# Patient Record
Sex: FEMALE | Race: WHITE | NOT HISPANIC OR LATINO | Employment: OTHER | URBAN - METROPOLITAN AREA
[De-identification: names, ages, dates, MRNs, and addresses within clinical notes are randomized per-mention and may not be internally consistent; named-entity substitution may affect disease eponyms.]

---

## 2023-04-14 LAB
ALANINE AMINOTRANSFERASE (SGPT) (U/L) IN SER/PLAS: 8 U/L (ref 7–45)
ALBUMIN (G/DL) IN SER/PLAS: 4.1 G/DL (ref 3.4–5)
ALKALINE PHOSPHATASE (U/L) IN SER/PLAS: 82 U/L (ref 33–136)
ANION GAP IN SER/PLAS: 13 MMOL/L (ref 10–20)
ASPARTATE AMINOTRANSFERASE (SGOT) (U/L) IN SER/PLAS: 12 U/L (ref 9–39)
BASOPHILS (10*3/UL) IN BLOOD BY AUTOMATED COUNT: 0.07 X10E9/L (ref 0–0.1)
BASOPHILS/100 LEUKOCYTES IN BLOOD BY AUTOMATED COUNT: 1.1 % (ref 0–2)
BILIRUBIN TOTAL (MG/DL) IN SER/PLAS: 0.3 MG/DL (ref 0–1.2)
CALCIDIOL (25 OH VITAMIN D3) (NG/ML) IN SER/PLAS: 33 NG/ML
CALCIUM (MG/DL) IN SER/PLAS: 9.3 MG/DL (ref 8.6–10.6)
CARBON DIOXIDE, TOTAL (MMOL/L) IN SER/PLAS: 27 MMOL/L (ref 21–32)
CHLORIDE (MMOL/L) IN SER/PLAS: 107 MMOL/L (ref 98–107)
CHOLESTEROL (MG/DL) IN SER/PLAS: 180 MG/DL (ref 0–199)
CHOLESTEROL IN HDL (MG/DL) IN SER/PLAS: 61.3 MG/DL
CHOLESTEROL/HDL RATIO: 2.9
COBALAMIN (VITAMIN B12) (PG/ML) IN SER/PLAS: 246 PG/ML (ref 211–911)
CREATININE (MG/DL) IN SER/PLAS: 0.94 MG/DL (ref 0.5–1.05)
EOSINOPHILS (10*3/UL) IN BLOOD BY AUTOMATED COUNT: 0.21 X10E9/L (ref 0–0.4)
EOSINOPHILS/100 LEUKOCYTES IN BLOOD BY AUTOMATED COUNT: 3.3 % (ref 0–6)
ERYTHROCYTE DISTRIBUTION WIDTH (RATIO) BY AUTOMATED COUNT: 13.9 % (ref 11.5–14.5)
ERYTHROCYTE MEAN CORPUSCULAR HEMOGLOBIN CONCENTRATION (G/DL) BY AUTOMATED: 31.9 G/DL (ref 32–36)
ERYTHROCYTE MEAN CORPUSCULAR VOLUME (FL) BY AUTOMATED COUNT: 91 FL (ref 80–100)
ERYTHROCYTES (10*6/UL) IN BLOOD BY AUTOMATED COUNT: 4.69 X10E12/L (ref 4–5.2)
GFR FEMALE: 62 ML/MIN/1.73M2
GLUCOSE (MG/DL) IN SER/PLAS: 112 MG/DL (ref 74–99)
HEMATOCRIT (%) IN BLOOD BY AUTOMATED COUNT: 42.6 % (ref 36–46)
HEMOGLOBIN (G/DL) IN BLOOD: 13.6 G/DL (ref 12–16)
IMMATURE GRANULOCYTES/100 LEUKOCYTES IN BLOOD BY AUTOMATED COUNT: 0.2 % (ref 0–0.9)
LDL: 101 MG/DL (ref 0–99)
LEUKOCYTES (10*3/UL) IN BLOOD BY AUTOMATED COUNT: 6.3 X10E9/L (ref 4.4–11.3)
LYMPHOCYTES (10*3/UL) IN BLOOD BY AUTOMATED COUNT: 1.38 X10E9/L (ref 0.8–3)
LYMPHOCYTES/100 LEUKOCYTES IN BLOOD BY AUTOMATED COUNT: 21.9 % (ref 13–44)
MONOCYTES (10*3/UL) IN BLOOD BY AUTOMATED COUNT: 0.61 X10E9/L (ref 0.05–0.8)
MONOCYTES/100 LEUKOCYTES IN BLOOD BY AUTOMATED COUNT: 9.7 % (ref 2–10)
NEUTROPHILS (10*3/UL) IN BLOOD BY AUTOMATED COUNT: 4.01 X10E9/L (ref 1.6–5.5)
NEUTROPHILS/100 LEUKOCYTES IN BLOOD BY AUTOMATED COUNT: 63.8 % (ref 40–80)
NRBC (PER 100 WBCS) BY AUTOMATED COUNT: 0 /100 WBC (ref 0–0)
PLATELETS (10*3/UL) IN BLOOD AUTOMATED COUNT: 181 X10E9/L (ref 150–450)
POTASSIUM (MMOL/L) IN SER/PLAS: 4.3 MMOL/L (ref 3.5–5.3)
PROTEIN TOTAL: 6.5 G/DL (ref 6.4–8.2)
SODIUM (MMOL/L) IN SER/PLAS: 143 MMOL/L (ref 136–145)
THYROTROPIN (MIU/L) IN SER/PLAS BY DETECTION LIMIT <= 0.05 MIU/L: 0.59 MIU/L (ref 0.44–3.98)
TRIGLYCERIDE (MG/DL) IN SER/PLAS: 87 MG/DL (ref 0–149)
UREA NITROGEN (MG/DL) IN SER/PLAS: 25 MG/DL (ref 6–23)
VLDL: 17 MG/DL (ref 0–40)

## 2023-10-24 ENCOUNTER — APPOINTMENT (OUTPATIENT)
Dept: PRIMARY CARE | Facility: CLINIC | Age: 78
End: 2023-10-24
Payer: MEDICARE

## 2023-11-21 ENCOUNTER — OFFICE VISIT (OUTPATIENT)
Dept: PRIMARY CARE | Facility: CLINIC | Age: 78
End: 2023-11-21
Payer: MEDICARE

## 2023-11-21 VITALS
WEIGHT: 134.26 LBS | DIASTOLIC BLOOD PRESSURE: 60 MMHG | RESPIRATION RATE: 16 BRPM | BODY MASS INDEX: 22.92 KG/M2 | SYSTOLIC BLOOD PRESSURE: 118 MMHG | HEART RATE: 90 BPM | OXYGEN SATURATION: 98 % | HEIGHT: 64 IN

## 2023-11-21 DIAGNOSIS — E78.5 DYSLIPIDEMIA: ICD-10-CM

## 2023-11-21 DIAGNOSIS — K21.00 GASTROESOPHAGEAL REFLUX DISEASE WITH ESOPHAGITIS WITHOUT HEMORRHAGE: ICD-10-CM

## 2023-11-21 DIAGNOSIS — F41.8 DEPRESSION WITH ANXIETY: Primary | ICD-10-CM

## 2023-11-21 DIAGNOSIS — J43.8 OTHER EMPHYSEMA (MULTI): ICD-10-CM

## 2023-11-21 DIAGNOSIS — I10 PRIMARY HYPERTENSION: ICD-10-CM

## 2023-11-21 PROCEDURE — 3078F DIAST BP <80 MM HG: CPT | Performed by: INTERNAL MEDICINE

## 2023-11-21 PROCEDURE — 90662 IIV NO PRSV INCREASED AG IM: CPT | Performed by: INTERNAL MEDICINE

## 2023-11-21 PROCEDURE — 3074F SYST BP LT 130 MM HG: CPT | Performed by: INTERNAL MEDICINE

## 2023-11-21 PROCEDURE — G0008 ADMIN INFLUENZA VIRUS VAC: HCPCS | Performed by: INTERNAL MEDICINE

## 2023-11-21 PROCEDURE — 99214 OFFICE O/P EST MOD 30 MIN: CPT | Performed by: INTERNAL MEDICINE

## 2023-11-21 RX ORDER — LEVOTHYROXINE SODIUM 75 UG/1
TABLET ORAL
COMMUNITY
End: 2023-12-14

## 2023-11-21 RX ORDER — BUPROPION HYDROCHLORIDE 100 MG/1
TABLET, EXTENDED RELEASE ORAL
COMMUNITY
End: 2023-11-21 | Stop reason: SDUPTHER

## 2023-11-21 RX ORDER — BUDESONIDE AND FORMOTEROL FUMARATE DIHYDRATE 80; 4.5 UG/1; UG/1
AEROSOL RESPIRATORY (INHALATION)
Qty: 1 EACH | Refills: 5 | Status: SHIPPED | OUTPATIENT
Start: 2023-11-21 | End: 2024-02-06 | Stop reason: SDUPTHER

## 2023-11-21 RX ORDER — BUDESONIDE AND FORMOTEROL FUMARATE DIHYDRATE 80; 4.5 UG/1; UG/1
2 AEROSOL RESPIRATORY (INHALATION) 2 TIMES DAILY
COMMUNITY
End: 2023-11-21 | Stop reason: SDUPTHER

## 2023-11-21 RX ORDER — ROSUVASTATIN CALCIUM 10 MG/1
TABLET, COATED ORAL
COMMUNITY
End: 2024-05-31 | Stop reason: SDUPTHER

## 2023-11-21 RX ORDER — EPINEPHRINE 0.3 MG/.3ML
0.3 INJECTION SUBCUTANEOUS
COMMUNITY

## 2023-11-21 RX ORDER — OMEPRAZOLE 20 MG/1
CAPSULE, DELAYED RELEASE ORAL
COMMUNITY
End: 2023-12-14

## 2023-11-21 RX ORDER — UBIDECARENONE 75 MG
1 CAPSULE ORAL
COMMUNITY

## 2023-11-21 RX ORDER — BUPROPION HYDROCHLORIDE 200 MG/1
TABLET, EXTENDED RELEASE ORAL
Qty: 30 TABLET | Refills: 1 | Status: SHIPPED | OUTPATIENT
Start: 2023-11-21 | End: 2024-01-08 | Stop reason: SDUPTHER

## 2023-11-21 RX ORDER — FAMOTIDINE 20 MG/1
TABLET, FILM COATED ORAL
Qty: 90 TABLET | Refills: 1 | Status: SHIPPED | OUTPATIENT
Start: 2023-11-21 | End: 2024-01-05 | Stop reason: ALTCHOICE

## 2023-11-21 RX ORDER — ERGOCALCIFEROL (VITAMIN D2) 200 MCG/ML
DROPS ORAL
COMMUNITY
End: 2024-03-05

## 2023-11-21 RX ORDER — NAPROXEN SODIUM 220 MG
TABLET ORAL
COMMUNITY
Start: 2015-04-09

## 2023-11-21 RX ORDER — METOPROLOL SUCCINATE 50 MG/1
50 TABLET, EXTENDED RELEASE ORAL DAILY
COMMUNITY
End: 2024-01-22

## 2023-11-21 ASSESSMENT — COLUMBIA-SUICIDE SEVERITY RATING SCALE - C-SSRS
1. IN THE PAST MONTH, HAVE YOU WISHED YOU WERE DEAD OR WISHED YOU COULD GO TO SLEEP AND NOT WAKE UP?: NO
6. HAVE YOU EVER DONE ANYTHING, STARTED TO DO ANYTHING, OR PREPARED TO DO ANYTHING TO END YOUR LIFE?: NO
2. HAVE YOU ACTUALLY HAD ANY THOUGHTS OF KILLING YOURSELF?: NO

## 2023-11-21 ASSESSMENT — PATIENT HEALTH QUESTIONNAIRE - PHQ9
2. FEELING DOWN, DEPRESSED OR HOPELESS: NOT AT ALL
1. LITTLE INTEREST OR PLEASURE IN DOING THINGS: NOT AT ALL
SUM OF ALL RESPONSES TO PHQ9 QUESTIONS 1 AND 2: 0

## 2023-11-21 ASSESSMENT — ENCOUNTER SYMPTOMS
LOSS OF SENSATION IN FEET: 0
DEPRESSION: 0
OCCASIONAL FEELINGS OF UNSTEADINESS: 0
CONSTIPATION: 0

## 2023-11-21 NOTE — PATIENT INSTRUCTIONS
Restart watching your diet, continue reducting your smoke frequency, today you received flu vaccine, in 2 weeks complete covid booster, Increase your bupropion to  200, add your famotdine at bed time, return here in 3months

## 2023-11-21 NOTE — PROGRESS NOTES
"Subjective   Patient ID: Caty Marquez is a 77 y.o. female who presents for Hypertension and Arthritis.    HPI   Her   had osteomyelitis, 6 weeks ago, still on rehab, her diet has not been heatlhy.  She has been taking  100 mg bupropion 12 h release since March, she has been taking in am, she has not noticed cleaning improvement  She  is smoking less  , related to stress, but minimal amount   Heartburn still is present on omeprazole in am.   She had bilateral carata surgery in spring.  She received bilateral steroid injection on April with great response,but no longer effective  Review of Systems   Cardiovascular:  Negative for chest pain.        Palpitations when lies down for years on/off, improved,   Gastrointestinal:  Negative for constipation.   All other systems reviewed and are negative.      Objective   /60 (BP Location: Right arm, Patient Position: Sitting, BP Cuff Size: Adult)   Pulse 90   Resp 16   Ht 1.626 m (5' 4\")   Wt 60.9 kg (134 lb 4.2 oz)   SpO2 98%   BMI 23.05 kg/m²     Physical Exam  Eyes - conjunctivae clear, PERRLA  HEENT - no impacted wax  Neck - no cervical lymphadenopathy, no thyromegaly  Axilla - no palpable lymphadenopathy  Cardiac- regular rate and rhythm, no murmurs, no carotid bruit, no JVP  Lung - clear to auscultation, no rales, no rhonchi, no wheezing  GI - normally active bowel sounds, non tender, non distended, no hepatosplenomegaly, no rebound  MSK - non deformities  Extremities - no edema, good distal pulses  Neuro - non focal, oriented x 3  Skin - no bruises, no rashes  Psychiatric - pleasant, well groom, no hallucinations    Assessment/Plan   Problem List Items Addressed This Visit             ICD-10-CM       Cardiac and Vasculature    Primary hypertension I10    Dyslipidemia E78.5       Gastrointestinal and Abdominal    Gastroesophageal reflux disease with esophagitis without hemorrhage K21.00    Relevant Medications    famotidine (Pepcid) 20 mg tablet    "    Mental Health    Depression with anxiety - Primary F41.8     Increase bupropion to 200 SR today         Relevant Medications    buPROPion SR (Wellbutrin SR) 200 mg 12 hr tablet       Pulmonary and Pneumonias    Other emphysema (CMS/HCC) J43.8    Relevant Medications    Symbicort 80-4.5 mcg/actuation inhaler

## 2023-12-11 ENCOUNTER — TELEPHONE (OUTPATIENT)
Dept: PRIMARY CARE | Facility: CLINIC | Age: 78
End: 2023-12-11

## 2023-12-12 ENCOUNTER — APPOINTMENT (OUTPATIENT)
Dept: PRIMARY CARE | Facility: CLINIC | Age: 78
End: 2023-12-12
Payer: MEDICARE

## 2023-12-12 ENCOUNTER — OFFICE VISIT (OUTPATIENT)
Dept: PRIMARY CARE | Facility: CLINIC | Age: 78
End: 2023-12-12
Payer: MEDICARE

## 2023-12-12 VITALS
RESPIRATION RATE: 16 BRPM | OXYGEN SATURATION: 95 % | SYSTOLIC BLOOD PRESSURE: 90 MMHG | DIASTOLIC BLOOD PRESSURE: 58 MMHG | BODY MASS INDEX: 22.81 KG/M2 | WEIGHT: 133.6 LBS | HEIGHT: 64 IN | HEART RATE: 78 BPM

## 2023-12-12 DIAGNOSIS — M47.22 CERVICAL RADICULOPATHY DUE TO DEGENERATIVE JOINT DISEASE OF SPINE: ICD-10-CM

## 2023-12-12 DIAGNOSIS — I10 PRIMARY HYPERTENSION: ICD-10-CM

## 2023-12-12 DIAGNOSIS — J20.8 ACUTE BRONCHITIS DUE TO OTHER SPECIFIED ORGANISMS: Primary | ICD-10-CM

## 2023-12-12 PROCEDURE — 99214 OFFICE O/P EST MOD 30 MIN: CPT | Performed by: INTERNAL MEDICINE

## 2023-12-12 PROCEDURE — 3074F SYST BP LT 130 MM HG: CPT | Performed by: INTERNAL MEDICINE

## 2023-12-12 PROCEDURE — 3078F DIAST BP <80 MM HG: CPT | Performed by: INTERNAL MEDICINE

## 2023-12-12 RX ORDER — AZITHROMYCIN 250 MG/1
TABLET, FILM COATED ORAL
Qty: 6 TABLET | Refills: 0 | Status: SHIPPED | OUTPATIENT
Start: 2023-12-12 | End: 2023-12-17

## 2023-12-12 RX ORDER — METHYLPREDNISOLONE 4 MG/1
TABLET ORAL
Qty: 21 TABLET | Refills: 0 | Status: SHIPPED | OUTPATIENT
Start: 2023-12-12 | End: 2024-01-05 | Stop reason: ALTCHOICE

## 2023-12-12 ASSESSMENT — PATIENT HEALTH QUESTIONNAIRE - PHQ9
SUM OF ALL RESPONSES TO PHQ9 QUESTIONS 1 AND 2: 0
2. FEELING DOWN, DEPRESSED OR HOPELESS: NOT AT ALL
1. LITTLE INTEREST OR PLEASURE IN DOING THINGS: NOT AT ALL

## 2023-12-12 ASSESSMENT — ENCOUNTER SYMPTOMS
DEPRESSION: 0
OCCASIONAL FEELINGS OF UNSTEADINESS: 0
LOSS OF SENSATION IN FEET: 0
NAUSEA: 0
WHEEZING: 0
SORE THROAT: 0
DIARRHEA: 0
SINUS PRESSURE: 0

## 2023-12-12 NOTE — PATIENT INSTRUCTIONS
Please re start  using albuterol rescue 2 puffs every 8 to 12 hours, take tessalon pearsl and prednisone /antibiotic

## 2023-12-12 NOTE — PROGRESS NOTES
"Subjective   Patient ID: Caty Marquez is a 77 y.o. female who presents for Cough, chest congestion, Headache, Generalized Body Aches, and Fatigue.    HPI 5 days started with body aches, rhinorrea, next day productive yellow cough, intermitent headache, chills, warm, intermitent earache, exertinal wheezing,   OTC acetaminophen  Since she got sick she increased her symbicort  Sick contacts unknown  Also 5 days ago after lifiting heavy started having intense neck pain , worse at rotation , radiated to scalp, feels left arm/ hand slight weak , no paresthesia or numbness. She recalls similar episode several years ago. No trauma.     Review of Systems   HENT:  Negative for postnasal drip, sinus pressure and sore throat.    Respiratory:  Negative for wheezing.    Gastrointestinal:  Negative for diarrhea and nausea.   All other systems reviewed and are negative.      Objective   BP 90/58 (BP Location: Right arm, Patient Position: Sitting, BP Cuff Size: Adult)   Pulse 78   Resp 16   Ht 1.626 m (5' 4\")   Wt 60.6 kg (133 lb 9.6 oz)   SpO2 95%   BMI 22.93 kg/m²     Physical Exam  General- non toxic, no respiratory distress  Eyes- Conjunctiva clear, PERRLA  Ears- TMs clear, no erythema, no fluid, TMs not retracted or bulging  Nose- external normal, turbinates free of swelling, erythema, and drainage, no sinus tenderness  Throat- mucus membranes moist, no erythema, no sinus tract drainage, no exudates or lesions  Neck- nontender, no enlarged cervical lymphadenopathy  Cardiac- regular rate and rhythm, no murmurs, no gallop or rubs  Lung- clear to auscultation, no rales, no rhonchi, no wheezing  GI- , nontender,no guarding  Extremities- no edema  Msk no tender spine , ROM very limited ,more looking to left side  Neuro- 4/5 strengh on left arm, oriented x 3  Psychiatric- no hallucinations    Assessment/Plan   Problem List Items Addressed This Visit    None  Visit Diagnoses         Codes    Acute bronchitis due to other " specified organisms    -  Primary J20.8    Relevant Medications    azithromycin (Zithromax) 250 mg tablet    Cervical radiculopathy due to degenerative joint disease of spine     M47.22    Relevant Medications    methylPREDNISolone (Medrol Dospak) 4 mg tablets

## 2023-12-13 DIAGNOSIS — E03.9 HYPOTHYROIDISM, UNSPECIFIED: ICD-10-CM

## 2023-12-13 DIAGNOSIS — K21.9 GASTRO-ESOPHAGEAL REFLUX DISEASE WITHOUT ESOPHAGITIS: ICD-10-CM

## 2023-12-14 RX ORDER — OMEPRAZOLE 20 MG/1
CAPSULE, DELAYED RELEASE ORAL
Qty: 180 CAPSULE | Refills: 1 | Status: SHIPPED | OUTPATIENT
Start: 2023-12-14 | End: 2024-03-25

## 2023-12-14 RX ORDER — LEVOTHYROXINE SODIUM 75 UG/1
TABLET ORAL
Qty: 90 TABLET | Refills: 1 | Status: SHIPPED | OUTPATIENT
Start: 2023-12-14 | End: 2024-03-25

## 2023-12-22 ENCOUNTER — APPOINTMENT (OUTPATIENT)
Dept: VASCULAR SURGERY | Facility: HOSPITAL | Age: 78
End: 2023-12-22
Payer: MEDICARE

## 2023-12-27 ENCOUNTER — APPOINTMENT (OUTPATIENT)
Dept: VASCULAR SURGERY | Facility: HOSPITAL | Age: 78
End: 2023-12-27
Payer: MEDICARE

## 2024-01-05 ENCOUNTER — HOSPITAL ENCOUNTER (OUTPATIENT)
Dept: VASCULAR MEDICINE | Facility: HOSPITAL | Age: 79
Discharge: HOME | End: 2024-01-05
Payer: MEDICARE

## 2024-01-05 ENCOUNTER — OFFICE VISIT (OUTPATIENT)
Dept: VASCULAR SURGERY | Facility: HOSPITAL | Age: 79
End: 2024-01-05
Payer: MEDICARE

## 2024-01-05 VITALS
DIASTOLIC BLOOD PRESSURE: 67 MMHG | SYSTOLIC BLOOD PRESSURE: 147 MMHG | OXYGEN SATURATION: 99 % | HEIGHT: 64 IN | HEART RATE: 88 BPM | BODY MASS INDEX: 22.53 KG/M2 | WEIGHT: 132 LBS

## 2024-01-05 DIAGNOSIS — I73.9 PERIPHERAL VASCULAR DISEASE, UNSPECIFIED (CMS-HCC): ICD-10-CM

## 2024-01-05 DIAGNOSIS — I73.9 PAD (PERIPHERAL ARTERY DISEASE) (CMS-HCC): Primary | ICD-10-CM

## 2024-01-05 DIAGNOSIS — I71.40 ABDOMINAL AORTIC ANEURYSM (AAA) WITHOUT RUPTURE, UNSPECIFIED PART (CMS-HCC): ICD-10-CM

## 2024-01-05 DIAGNOSIS — I10 ESSENTIAL (PRIMARY) HYPERTENSION: ICD-10-CM

## 2024-01-05 DIAGNOSIS — I82.622 ACUTE EMBOLISM AND THROMBOSIS OF DEEP VEINS OF LEFT UPPER EXTREMITY (MULTI): ICD-10-CM

## 2024-01-05 DIAGNOSIS — I71.43 INFRARENAL ABDOMINAL AORTIC ANEURYSM (AAA) WITHOUT RUPTURE (CMS-HCC): ICD-10-CM

## 2024-01-05 DIAGNOSIS — I71.40 ABDOMINAL AORTIC ANEURYSM, WITHOUT RUPTURE, UNSPECIFIED (CMS-HCC): ICD-10-CM

## 2024-01-05 LAB
BODY SURFACE AREA: 1.64 M2
BODY SURFACE AREA: 1.64 M2

## 2024-01-05 PROCEDURE — 93922 UPR/L XTREMITY ART 2 LEVELS: CPT

## 2024-01-05 PROCEDURE — 99213 OFFICE O/P EST LOW 20 MIN: CPT | Performed by: NURSE PRACTITIONER

## 2024-01-05 PROCEDURE — 4004F PT TOBACCO SCREEN RCVD TLK: CPT | Performed by: NURSE PRACTITIONER

## 2024-01-05 PROCEDURE — 3078F DIAST BP <80 MM HG: CPT | Performed by: NURSE PRACTITIONER

## 2024-01-05 PROCEDURE — 93978 VASCULAR STUDY: CPT | Performed by: INTERNAL MEDICINE

## 2024-01-05 PROCEDURE — 3077F SYST BP >= 140 MM HG: CPT | Performed by: NURSE PRACTITIONER

## 2024-01-05 PROCEDURE — 93978 VASCULAR STUDY: CPT

## 2024-01-05 PROCEDURE — 93922 UPR/L XTREMITY ART 2 LEVELS: CPT | Performed by: INTERNAL MEDICINE

## 2024-01-05 PROCEDURE — 99406 BEHAV CHNG SMOKING 3-10 MIN: CPT | Performed by: NURSE PRACTITIONER

## 2024-01-05 ASSESSMENT — ENCOUNTER SYMPTOMS
OCCASIONAL FEELINGS OF UNSTEADINESS: 0
LOSS OF SENSATION IN FEET: 0
DEPRESSION: 0

## 2024-01-08 DIAGNOSIS — F41.8 DEPRESSION WITH ANXIETY: ICD-10-CM

## 2024-01-08 RX ORDER — BUPROPION HYDROCHLORIDE 200 MG/1
TABLET, EXTENDED RELEASE ORAL
Qty: 30 TABLET | Refills: 1 | Status: SHIPPED | OUTPATIENT
Start: 2024-01-08 | End: 2024-02-06 | Stop reason: SDUPTHER

## 2024-01-19 DIAGNOSIS — I10 ESSENTIAL (PRIMARY) HYPERTENSION: ICD-10-CM

## 2024-01-19 NOTE — PROGRESS NOTES
Vascular Surgery Clinic Note    CC: FUV PAD and AAA    History Of Present Illness:   Caty Marquez is a 78 y.o. female here for routine follow-up.  She has known peripheral artery disease and is currently engaging in a walking program.  She is able to walk 1/2 mile distance.  This is not lifestyle limiting for her.  She denies nocturnal rest pain or tissue loss.  DAVID today is 0.72 on the right and 0.71 on the left.    She still works at [employer] and her [employer].    She denies amaurosis fugax or TIA symptoms.    She has a known small abdominal aortic aneurysm that currently measures 3.2 cm and is unchanged from prior imaging.  She continues to smoke 1 cigarette a day.     Medical History:  Patient Active Problem List   Diagnosis    Depression with anxiety    Other emphysema (CMS/HCC)    Primary hypertension    Dyslipidemia    Gastroesophageal reflux disease with esophagitis without hemorrhage    Acute bronchitis due to other specified organisms    Cervical radiculopathy due to degenerative joint disease of spine        SH:    Social Determinants of Health     Tobacco Use: High Risk (1/5/2024)    Patient History     Smoking Tobacco Use: Some Days     Smokeless Tobacco Use: Never     Passive Exposure: Not on file   Alcohol Use: Not on file   Financial Resource Strain: Not on file   Food Insecurity: Not on file   Transportation Needs: Not on file   Physical Activity: Not on file   Stress: Not on file   Social Connections: Not on file   Intimate Partner Violence: Not on file   Depression: Not at risk (12/12/2023)    PHQ-2     PHQ-2 Score: 0   Housing Stability: Not on file   Utilities: Not on file   Digital Equity: Not on file        FH:  No family history on file.     Allergies:   Allergies   Allergen Reactions    Bee Sting Kit Anaphylaxis    Quinolones Anaphylaxis       ROS:  All systems were reviewed and noted to be negative, other than described above.     Objective:  Last Recorded Vitals  Blood pressure  "147/67, pulse 88, height 1.626 m (5' 4\"), weight 59.9 kg (132 lb), SpO2 99 %.    Meds:   Current Outpatient Medications   Medication Instructions    albuterol 90 mcg/actuation aerosol powdr breath activated inhaler inhalation, Every 4 hours PRN    buPROPion SR (Wellbutrin SR) 200 mg 12 hr tablet Do not crush, chew, or split.    cyanocobalamin (Vitamin B-12) 500 mcg tablet 1 tablet, oral, Daily RT    EPINEPHrine (EPIPEN) 0.3 mg, intramuscular, As Directed    ergocalciferol (Vitamin D-2) 200 mcg/mL (8,000 unit/mL) drops oral    metoprolol succinate XL (TOPROL-XL) 50 mg, oral, Daily    naproxen sodium (Aleve) 220 mg tablet oral    omeprazole (PriLOSEC) 20 mg DR capsule TAKE 1 CAPSULE DAILY EVERY MORNING 30 minutes BEFORE BREAKFAST. AND AT BED TIME    rosuvastatin (Crestor) 10 mg tablet TAKE 1 TABLET BY MOUTH, 4 X WEEK,    Symbicort 80-4.5 mcg/actuation inhaler Use 2 puffs once a day, Rinse mouth after use.    Synthroid 75 mcg tablet TAKE 1 TABLET BY MOUTH 30 min prior to breakast       Exam:  Constitutional: Well appearing, NAD   PSYCH: Appropriate mood and affect  Eyes: Sclera clear   Neck: Supple  CV: No tachycardia   RESP: Unlabored breathing   GI: Soft, nontender, non-distended.  SKIN: No lesions  NEURO: No focal deficits noted  EXTREMITIES: Warm & well perfused. No leg edema.     Imaging Reviewed:  DAVID without exercise 01/05/2024    [place] [address]  Tel [phone] and Fax [phone]      Vascular Lab Report  [facility] US ANKLE BRACHIAL INDEX (DAVID) WITHOUT EXERCISE      Patient Name:      MACKENZIE Bruce Physician:  [id number] Madeline Javier MD  Study Date:        1/5/2024            Ordering Physician: [id number] DAO GAITAN  MRN/PID:           [id number]            Technologist:       Petra Clement RVSARAN  Accession#:        [id number]        Technologist 2:  Date of Birth/Age: 1945 / 78     Encounter#:         [id number]  years  Gender:           "  F  Admission Status:  Outpatient          Location Performed: [facility]      Diagnosis/ICD: Acute embolism and thrombosis of deep veins of left upper  extremity-I82.622; Peripheral vascular disease, unspecified-I73.9  CPT Codes:     92426 Peripheral artery DAVID Only      CONCLUSIONS:  Right Lower PVR: Evidence of mild arterial occlusive disease in the right lower extremity at rest. Decreased digital perfusion noted. Monophasic flow is noted in the right posterior tibial artery and right dorsalis pedis artery. Triphasic flow is noted in the right common femoral artery.  Left Lower PVR: Evidence of mild arterial occlusive disease in the left lower extremity at rest. Decreased digital perfusion noted. Monophasic flow is noted in the left posterior tibial artery and left dorsalis pedis artery. Triphasic flow is noted in the left common femoral artery.    Comparison:  Compared with study from 7/26/2022, no significant change.    Imaging & Doppler Findings:    RIGHT Lower PVR                Pressures Ratios  Right Posterior Tibial (Ankle) 105 mmHg  0.70  Right Dorsalis Pedis (Ankle)   108 mmHg  0.72  Right Digit (Great Toe)        93 mmHg   0.62        LEFT Lower PVR                Pressures Ratios  Left Posterior Tibial (Ankle) 105 mmHg  0.70  Left Dorsalis Pedis (Ankle)   106 mmHg  0.71  Left Digit (Great Toe)        53 mmHg   0.35      Right     Left  Brachial Pressure 147 mmHg 150 mmHg        [id number] Madeline Javier MD  Electronically signed by [id number] Madeline Javier MD on 1/5/2024 at 12:26:40 PM        ** Final **  Vascular US aorta iliac duplex complete 01/05/2024    [place] [address]  Tel [phone] and Fax [phone]      Vascular Lab Report  [facility] US AORTA ILIAC DUPLEX COMPLETE      Patient Name:      MACKENZIE Bruce Physician:  [id number] Jorge Haro MD, RPVI  Study Date:        1/5/2024              Ordering Physician: [id number] DAO BRIZUELA  ARNIE  MRN/PID:           [id number]              Technologist:       Yareli Gardiner RVT  Accession#:        [id number]          Technologist 2:  Date of Birth/Age: 1945 / 78 years Encounter#:         [id number]  Gender:            F  Admission Status:  Outpatient            Location Performed: [facility]      Diagnosis/ICD: Abdominal aortic aneurysm, without rupture, unspecified-I71.40  CPT Codes:     87038 Duplex Aorta/IVC/Iliac/Bypass Graft      CONCLUSIONS:  Aorta/Common Iliac Arteries/IVC: A fusiform aneurysm is noted at the level of the infrarenal aorta. Technically difficult study due to patient was not NPO.    Comparison:  Compared with study from 12/14/2022, no significant change.    Imaging & Doppler Findings:    AORTA     AP    Lateral    PSV  Proximal 2.40 cm         70.0 cm/s  Mid    2.20 cm 2.20 cm 94.0 cm/s  Distal  3.20 cm 3.20 cm 39.0 cm/s    RIGHT       AP    Lateral    PSV  ADRIANA Proximal 0.70 cm 0.90 cm 69.00 cm/s    LEFT       AP    Lateral     PSV  ADRIANA Proximal 0.80 cm 1.00 cm 148.00 cm/s      [id number] Jorge Haro MD, RPVI  Electronically signed by [id number] Jorge Haro MD, RPVI on 1/5/2024 at 12:13:11 PM        ** Final **      Assessment & Plan:  1. PAD (peripheral artery disease) (CMS/HCC)  Vascular US ankle brachial index (DAVID) without exercise      2. Abdominal aortic aneurysm (AAA) without rupture, unspecified part (CMS/HCC)  Vascular US aorta iliac duplex complete      3. Infrarenal abdominal aortic aneurysm (AAA) without rupture (CMS/HCC)  Vascular US aorta iliac duplex complete        PAD with non-lifestyle limiting claudication symptoms.   AAA - small 3.2 cm.   - smoking cessation   - RTC in 1 year with DAVID and abdominal duplex     Ready to quit: Yes  Counseling given: Yes    FARA Botello-CNP

## 2024-01-22 RX ORDER — METOPROLOL SUCCINATE 50 MG/1
50 TABLET, EXTENDED RELEASE ORAL DAILY
Qty: 90 TABLET | Refills: 1 | Status: SHIPPED | OUTPATIENT
Start: 2024-01-22 | End: 2024-03-25

## 2024-02-06 DIAGNOSIS — J44.1 COPD EXACERBATION (MULTI): ICD-10-CM

## 2024-02-06 DIAGNOSIS — F41.8 DEPRESSION WITH ANXIETY: ICD-10-CM

## 2024-02-06 DIAGNOSIS — J43.8 OTHER EMPHYSEMA (MULTI): ICD-10-CM

## 2024-02-06 RX ORDER — BUDESONIDE AND FORMOTEROL FUMARATE DIHYDRATE 80; 4.5 UG/1; UG/1
AEROSOL RESPIRATORY (INHALATION)
Qty: 6.5 G | Refills: 5 | Status: SHIPPED | OUTPATIENT
Start: 2024-02-06 | End: 2024-05-31 | Stop reason: SDUPTHER

## 2024-02-06 RX ORDER — BUPROPION HYDROCHLORIDE 200 MG/1
TABLET, EXTENDED RELEASE ORAL
Qty: 30 TABLET | Refills: 5 | Status: SHIPPED | OUTPATIENT
Start: 2024-02-06 | End: 2024-03-05 | Stop reason: SDUPTHER

## 2024-03-05 ENCOUNTER — OFFICE VISIT (OUTPATIENT)
Dept: PRIMARY CARE | Facility: CLINIC | Age: 79
End: 2024-03-05
Payer: MEDICARE

## 2024-03-05 VITALS
TEMPERATURE: 97.2 F | HEIGHT: 64 IN | DIASTOLIC BLOOD PRESSURE: 68 MMHG | OXYGEN SATURATION: 100 % | BODY MASS INDEX: 23.26 KG/M2 | SYSTOLIC BLOOD PRESSURE: 124 MMHG | HEART RATE: 100 BPM | RESPIRATION RATE: 16 BRPM | WEIGHT: 136.24 LBS

## 2024-03-05 DIAGNOSIS — E55.9 VITAMIN D DEFICIENCY, UNSPECIFIED: ICD-10-CM

## 2024-03-05 DIAGNOSIS — I10 PRIMARY HYPERTENSION: ICD-10-CM

## 2024-03-05 DIAGNOSIS — J43.9 CHRONIC EMPHYSEMA SYNDROME (MULTI): ICD-10-CM

## 2024-03-05 DIAGNOSIS — F41.8 DEPRESSION WITH ANXIETY: Primary | ICD-10-CM

## 2024-03-05 DIAGNOSIS — E78.5 DYSLIPIDEMIA: ICD-10-CM

## 2024-03-05 DIAGNOSIS — R73.9 HYPERGLYCEMIA: ICD-10-CM

## 2024-03-05 DIAGNOSIS — I79.8 PERIPHERAL VASCULAR DISEASE, SECONDARY (CMS-HCC): ICD-10-CM

## 2024-03-05 DIAGNOSIS — Z00.00 WELLNESS EXAMINATION: ICD-10-CM

## 2024-03-05 DIAGNOSIS — K21.00 GASTROESOPHAGEAL REFLUX DISEASE WITH ESOPHAGITIS WITHOUT HEMORRHAGE: ICD-10-CM

## 2024-03-05 PROCEDURE — 90677 PCV20 VACCINE IM: CPT | Performed by: INTERNAL MEDICINE

## 2024-03-05 PROCEDURE — 4004F PT TOBACCO SCREEN RCVD TLK: CPT | Performed by: INTERNAL MEDICINE

## 2024-03-05 PROCEDURE — G0439 PPPS, SUBSEQ VISIT: HCPCS | Performed by: INTERNAL MEDICINE

## 2024-03-05 PROCEDURE — 1159F MED LIST DOCD IN RCRD: CPT | Performed by: INTERNAL MEDICINE

## 2024-03-05 PROCEDURE — 1160F RVW MEDS BY RX/DR IN RCRD: CPT | Performed by: INTERNAL MEDICINE

## 2024-03-05 PROCEDURE — G0009 ADMIN PNEUMOCOCCAL VACCINE: HCPCS | Performed by: INTERNAL MEDICINE

## 2024-03-05 PROCEDURE — 1125F AMNT PAIN NOTED PAIN PRSNT: CPT | Performed by: INTERNAL MEDICINE

## 2024-03-05 PROCEDURE — 1170F FXNL STATUS ASSESSED: CPT | Performed by: INTERNAL MEDICINE

## 2024-03-05 PROCEDURE — 99214 OFFICE O/P EST MOD 30 MIN: CPT | Performed by: INTERNAL MEDICINE

## 2024-03-05 PROCEDURE — 3078F DIAST BP <80 MM HG: CPT | Performed by: INTERNAL MEDICINE

## 2024-03-05 PROCEDURE — 3074F SYST BP LT 130 MM HG: CPT | Performed by: INTERNAL MEDICINE

## 2024-03-05 RX ORDER — BUPROPION HYDROCHLORIDE 200 MG/1
TABLET, EXTENDED RELEASE ORAL
Qty: 30 TABLET | Refills: 5 | Status: SHIPPED | OUTPATIENT
Start: 2024-03-05

## 2024-03-05 ASSESSMENT — PAIN SCALES - GENERAL: PAINLEVEL: 6

## 2024-03-05 ASSESSMENT — PATIENT HEALTH QUESTIONNAIRE - PHQ9
1. LITTLE INTEREST OR PLEASURE IN DOING THINGS: NOT AT ALL
SUM OF ALL RESPONSES TO PHQ9 QUESTIONS 1 AND 2: 0
2. FEELING DOWN, DEPRESSED OR HOPELESS: NOT AT ALL

## 2024-03-05 ASSESSMENT — ENCOUNTER SYMPTOMS
LOSS OF SENSATION IN FEET: 0
BLOOD IN STOOL: 0
DEPRESSION: 0
OCCASIONAL FEELINGS OF UNSTEADINESS: 0
HEADACHES: 0
SLEEP DISTURBANCE: 0

## 2024-03-05 ASSESSMENT — ACTIVITIES OF DAILY LIVING (ADL)
DRESSING: INDEPENDENT
BATHING: INDEPENDENT
TAKING_MEDICATION: INDEPENDENT
DOING_HOUSEWORK: INDEPENDENT
MANAGING_FINANCES: INDEPENDENT
GROCERY_SHOPPING: INDEPENDENT

## 2024-03-05 NOTE — PATIENT INSTRUCTIONS
Please reduce even more smoke, re start bupropion, complete asap your mammogram, continue other meds the same, return fasting to lab, TODAYU YOU RECEIVED PREVNAR 20 (PNEUMONIA ) , start in 2 weeks shingrex vaccine at local pharmacy, return here in 4 m

## 2024-03-05 NOTE — ASSESSMENT & PLAN NOTE
Discussed diet, exercise, immunizations, and screening appropriate for their age.  Colonoscopy: June 2015 due 2025  Dexa: July 2022 mild osteopenia  Mammogram: oct 2021

## 2024-03-05 NOTE — PROGRESS NOTES
"Subjective   Reason for Visit: Caty Marquez is an 78 y.o. female here for a Medicare Wellness visit.          Reviewed all medications by prescribing practitioner or clinical pharmacist (such as prescriptions, OTCs, herbal therapies and supplements) and documented in the medical record.HPI She is very active ,  working at [employer] and her [employer], also helping her  with peritoneal dyalisis.   Her diet is breakfast smoothie, or bagel . lunch soup or left overs, dinner protein and vegetable or salad, loves candies,   Smoking about 1 box /month, less than before. She has not taken her bupropion for 2 months. Wants to re start.  Her cramps at night still happened, but denies claudication when walking. Saw vascular in jan 2024, vascular insuficnecy unchanged from last time ( mild bilateral )      Patient Care Team:  Elisha Hernandez MD as PCP - General  Elisha Hernandez MD as PCP - [employer] PCP     Review of Systems   Respiratory:          Less cough, more tolerance to walking without sob   Gastrointestinal:  Negative for blood in stool.        Heartburn more control with PPI, using less tums   Musculoskeletal:         Lower back pain after standing long   Neurological:  Negative for headaches.   Psychiatric/Behavioral:  Negative for sleep disturbance.    All other systems reviewed and are negative.      Objective   Vitals:  /68 (BP Location: Left arm, Patient Position: Sitting, BP Cuff Size: Adult)   Pulse 100   Temp 36.2 °C (97.2 °F)   Resp 16   Ht 1.626 m (5' 4\")   Wt 61.8 kg (136 lb 3.9 oz)   SpO2 100%   BMI 23.39 kg/m²       Physical Exam  Eyes- Conjunctiva clear  Tms bilateral clear  Neck-no thyromegaly  Cardiac- regular rate and rhythm, no murmurs  Lung- clear to auscultation  GI- present  bowel sounds, nontender, no rebound  MSK- toe deformities  Extremities- no edema, weak distal pulses, hyperemic changes in toes, pretibial skin clear  Neuro- non focal, " oriented x 3  Psychiatric- pleasant, well groomed, no hallucinations    Assessment/Plan   Problem List Items Addressed This Visit       Depression with anxiety - Primary    Current Assessment & Plan     Re start med,          Relevant Medications    buPROPion SR (Wellbutrin SR) 200 mg 12 hr tablet    Chronic emphysema syndrome (CMS/HCC)    Current Assessment & Plan     stable         Relevant Orders    CBC    Primary hypertension    Current Assessment & Plan     Well control         Relevant Orders    Comprehensive Metabolic Panel    TSH with reflex to Free T4 if abnormal    Dyslipidemia    Relevant Orders    Lipid Panel    Gastroesophageal reflux disease with esophagitis without hemorrhage    Current Assessment & Plan     stable         Wellness examination    Current Assessment & Plan     Discussed diet, exercise, immunizations, and screening appropriate for their age.  Colonoscopy: June 2015 due 2025  Dexa: July 2022 mild osteopenia  Mammogram: oct 2021             Peripheral vascular disease, secondary (CMS/HCC)    Relevant Orders    CBC    Hemoglobin A1C    Lipid Panel     Other Visit Diagnoses       Vitamin D deficiency, unspecified        Relevant Orders    Vitamin D 25-Hydroxy,Total (for eval of Vitamin D levels)    Hyperglycemia        Relevant Orders    Hemoglobin A1C

## 2024-03-24 DIAGNOSIS — E03.9 HYPOTHYROIDISM, UNSPECIFIED: ICD-10-CM

## 2024-03-24 DIAGNOSIS — I10 ESSENTIAL (PRIMARY) HYPERTENSION: ICD-10-CM

## 2024-03-24 DIAGNOSIS — K21.9 GASTRO-ESOPHAGEAL REFLUX DISEASE WITHOUT ESOPHAGITIS: ICD-10-CM

## 2024-03-25 RX ORDER — OMEPRAZOLE 20 MG/1
CAPSULE, DELAYED RELEASE ORAL
Qty: 180 CAPSULE | Refills: 1 | Status: SHIPPED | OUTPATIENT
Start: 2024-03-25

## 2024-03-25 RX ORDER — LEVOTHYROXINE SODIUM 75 UG/1
TABLET ORAL
Qty: 90 TABLET | Refills: 1 | Status: SHIPPED | OUTPATIENT
Start: 2024-03-25

## 2024-03-25 RX ORDER — METOPROLOL SUCCINATE 50 MG/1
50 TABLET, EXTENDED RELEASE ORAL DAILY
Qty: 90 TABLET | Refills: 1 | Status: SHIPPED | OUTPATIENT
Start: 2024-03-25

## 2024-05-31 DIAGNOSIS — J43.8 OTHER EMPHYSEMA (MULTI): ICD-10-CM

## 2024-05-31 DIAGNOSIS — E78.5 DYSLIPIDEMIA: ICD-10-CM

## 2024-05-31 DIAGNOSIS — K21.9 GASTRO-ESOPHAGEAL REFLUX DISEASE WITHOUT ESOPHAGITIS: ICD-10-CM

## 2024-05-31 RX ORDER — ROSUVASTATIN CALCIUM 10 MG/1
TABLET, COATED ORAL
Qty: 70 TABLET | Refills: 0 | Status: SHIPPED | OUTPATIENT
Start: 2024-05-31

## 2024-05-31 RX ORDER — BUDESONIDE AND FORMOTEROL FUMARATE DIHYDRATE 80; 4.5 UG/1; UG/1
AEROSOL RESPIRATORY (INHALATION)
Qty: 6.5 G | Refills: 5 | Status: SHIPPED | OUTPATIENT
Start: 2024-05-31

## 2024-07-11 DIAGNOSIS — E03.9 HYPOTHYROIDISM, UNSPECIFIED: ICD-10-CM

## 2024-07-11 DIAGNOSIS — J43.8 OTHER EMPHYSEMA (MULTI): ICD-10-CM

## 2024-07-11 RX ORDER — LEVOTHYROXINE SODIUM 75 UG/1
TABLET ORAL
Qty: 90 TABLET | Refills: 1 | Status: SHIPPED | OUTPATIENT
Start: 2024-07-11

## 2024-07-11 RX ORDER — DILTIAZEM HYDROCHLORIDE 60 MG/1
TABLET, FILM COATED ORAL
Qty: 6.5 G | Refills: 5 | Status: SHIPPED | OUTPATIENT
Start: 2024-07-11

## 2024-07-19 ENCOUNTER — APPOINTMENT (OUTPATIENT)
Dept: PRIMARY CARE | Facility: CLINIC | Age: 79
End: 2024-07-19
Payer: MEDICARE

## 2024-09-11 ENCOUNTER — APPOINTMENT (OUTPATIENT)
Dept: PRIMARY CARE | Facility: CLINIC | Age: 79
End: 2024-09-11
Payer: MEDICARE

## 2024-09-11 VITALS
BODY MASS INDEX: 23.3 KG/M2 | HEART RATE: 70 BPM | TEMPERATURE: 96.9 F | SYSTOLIC BLOOD PRESSURE: 138 MMHG | WEIGHT: 136.47 LBS | RESPIRATION RATE: 16 BRPM | HEIGHT: 64 IN | DIASTOLIC BLOOD PRESSURE: 84 MMHG | OXYGEN SATURATION: 98 %

## 2024-09-11 DIAGNOSIS — K21.00 GASTROESOPHAGEAL REFLUX DISEASE WITH ESOPHAGITIS WITHOUT HEMORRHAGE: ICD-10-CM

## 2024-09-11 DIAGNOSIS — J43.9 CHRONIC EMPHYSEMA SYNDROME (MULTI): Primary | ICD-10-CM

## 2024-09-11 DIAGNOSIS — E78.5 DYSLIPIDEMIA: ICD-10-CM

## 2024-09-11 DIAGNOSIS — I10 PRIMARY HYPERTENSION: ICD-10-CM

## 2024-09-11 PROBLEM — J20.8 ACUTE BRONCHITIS DUE TO OTHER SPECIFIED ORGANISMS: Status: RESOLVED | Noted: 2023-12-12 | Resolved: 2024-09-11

## 2024-09-11 PROCEDURE — 4004F PT TOBACCO SCREEN RCVD TLK: CPT | Performed by: INTERNAL MEDICINE

## 2024-09-11 PROCEDURE — 1160F RVW MEDS BY RX/DR IN RCRD: CPT | Performed by: INTERNAL MEDICINE

## 2024-09-11 PROCEDURE — 1159F MED LIST DOCD IN RCRD: CPT | Performed by: INTERNAL MEDICINE

## 2024-09-11 PROCEDURE — 3075F SYST BP GE 130 - 139MM HG: CPT | Performed by: INTERNAL MEDICINE

## 2024-09-11 PROCEDURE — 99214 OFFICE O/P EST MOD 30 MIN: CPT | Performed by: INTERNAL MEDICINE

## 2024-09-11 PROCEDURE — 3079F DIAST BP 80-89 MM HG: CPT | Performed by: INTERNAL MEDICINE

## 2024-09-11 RX ORDER — ACETAMINOPHEN 500 MG
500 TABLET ORAL EVERY 6 HOURS PRN
COMMUNITY

## 2024-09-11 ASSESSMENT — ENCOUNTER SYMPTOMS
HEADACHES: 0
FATIGUE: 0
COUGH: 0
BLOOD IN STOOL: 0
DEPRESSION: 0
OCCASIONAL FEELINGS OF UNSTEADINESS: 0
LOSS OF SENSATION IN FEET: 0
CONSTIPATION: 0

## 2024-09-11 ASSESSMENT — PATIENT HEALTH QUESTIONNAIRE - PHQ9
1. LITTLE INTEREST OR PLEASURE IN DOING THINGS: NOT AT ALL
1. LITTLE INTEREST OR PLEASURE IN DOING THINGS: NOT AT ALL
SUM OF ALL RESPONSES TO PHQ9 QUESTIONS 1 AND 2: 0
1. LITTLE INTEREST OR PLEASURE IN DOING THINGS: NOT AT ALL
2. FEELING DOWN, DEPRESSED OR HOPELESS: NOT AT ALL
2. FEELING DOWN, DEPRESSED OR HOPELESS: NOT AT ALL
SUM OF ALL RESPONSES TO PHQ9 QUESTIONS 1 AND 2: 0
2. FEELING DOWN, DEPRESSED OR HOPELESS: NOT AT ALL
SUM OF ALL RESPONSES TO PHQ9 QUESTIONS 1 AND 2: 0

## 2024-09-11 ASSESSMENT — COLUMBIA-SUICIDE SEVERITY RATING SCALE - C-SSRS
6. HAVE YOU EVER DONE ANYTHING, STARTED TO DO ANYTHING, OR PREPARED TO DO ANYTHING TO END YOUR LIFE?: NO
1. IN THE PAST MONTH, HAVE YOU WISHED YOU WERE DEAD OR WISHED YOU COULD GO TO SLEEP AND NOT WAKE UP?: NO
2. HAVE YOU ACTUALLY HAD ANY THOUGHTS OF KILLING YOURSELF?: NO

## 2024-09-11 NOTE — PROGRESS NOTES
"Subjective   Patient ID: Caty Marquez is a 78 y.o. female who presents for Hypertension. Mood, cholesterol    HPI She is doing light weights, her ability to walk short distances has improved . She sees annually vascular medicine. Her most recent aortic vascular ultrasound was consider stable.  She tried bupropion for several months and did not help with smoke cessation, she stopped 5 weeks ago. She is currently smoking few cigarettes/day or none.     Review of Systems   Constitutional:  Negative for fatigue.   Respiratory:  Negative for cough.         Sob at intense humidity and heat , requires albuterol PRN. She uses symbicort daily   Gastrointestinal:  Negative for blood in stool and constipation.        Heartburn with PPI triggered by  foods.   Musculoskeletal:         Right arm and shoulder pain after raking leaves 2 m ago, no worsen, only painful when place hands in waist.   Neurological:  Negative for headaches.   All other systems reviewed and are negative.      Objective   /84 (BP Location: Left arm, Patient Position: Sitting, BP Cuff Size: Adult)   Pulse 70   Temp 36.1 °C (96.9 °F)   Resp 16   Ht 1.626 m (5' 4\")   Wt 61.9 kg (136 lb 7.4 oz)   SpO2 98%   BMI 23.42 kg/m²     Physical Exam  Eyes - conjunctivae clear, PERRLA  HEENT - no impacted wax  Neck - no cervical lymphadenopathy, no thyromegaly  Axilla - no palpable lymphadenopathy  Cardiac- regular rate and rhythm, no murmurs, no carotid bruit, no JVP  Lung - clear to auscultation, no rales, no rhonchi, no wheezing  GI - normally active bowel sounds, non tender, non distended, no hepatosplenomegaly, no rebound  MSK - non deformities  Extremities - no edema, trophic skin changes, hyperpigmentation, weak arterial distal pulses  Neuro - non focal, oriented x 3  Skin - no bruises, no rashes  Psychiatric - pleasant, well groom, no hallucinations    Assessment/Plan   Problem List Items Addressed This Visit             ICD-10-CM    Chronic " emphysema syndrome (Multi) - Primary J43.9     Stable on meds         Primary hypertension I10     Well control         Dyslipidemia E78.5     Pending labs, overdue         Gastroesophageal reflux disease with esophagitis without hemorrhage K21.00     No clear red flags for ECG at present time, discussed jayla , continue meds

## 2024-09-11 NOTE — PATIENT INSTRUCTIONS
Keep working on smoke reduction, complete this fall covid, RSV influenza and shingrex vaccine at local pharmacy. Return to lab in 2 days. Contac  vascular office for schedule of studies and follow up with Jane Muse. Return in 6 months for welllness

## 2024-09-13 ENCOUNTER — LAB (OUTPATIENT)
Dept: LAB | Facility: LAB | Age: 79
End: 2024-09-13
Payer: MEDICARE

## 2024-09-13 DIAGNOSIS — J43.9 CHRONIC EMPHYSEMA SYNDROME (MULTI): ICD-10-CM

## 2024-09-13 DIAGNOSIS — R73.9 HYPERGLYCEMIA: ICD-10-CM

## 2024-09-13 DIAGNOSIS — E55.9 VITAMIN D DEFICIENCY, UNSPECIFIED: ICD-10-CM

## 2024-09-13 DIAGNOSIS — E78.5 DYSLIPIDEMIA: ICD-10-CM

## 2024-09-13 DIAGNOSIS — I10 PRIMARY HYPERTENSION: ICD-10-CM

## 2024-09-13 DIAGNOSIS — I79.8 PERIPHERAL VASCULAR DISEASE, SECONDARY (CMS-HCC): ICD-10-CM

## 2024-09-13 LAB
25(OH)D3 SERPL-MCNC: 38 NG/ML (ref 30–100)
ALBUMIN SERPL BCP-MCNC: 4.2 G/DL (ref 3.4–5)
ALP SERPL-CCNC: 96 U/L (ref 33–136)
ALT SERPL W P-5'-P-CCNC: 9 U/L (ref 7–45)
ANION GAP SERPL CALC-SCNC: 12 MMOL/L (ref 10–20)
AST SERPL W P-5'-P-CCNC: 13 U/L (ref 9–39)
BILIRUB SERPL-MCNC: 0.5 MG/DL (ref 0–1.2)
BUN SERPL-MCNC: 14 MG/DL (ref 6–23)
CALCIUM SERPL-MCNC: 9.7 MG/DL (ref 8.6–10.6)
CHLORIDE SERPL-SCNC: 101 MMOL/L (ref 98–107)
CHOLEST SERPL-MCNC: 195 MG/DL (ref 0–199)
CHOLESTEROL/HDL RATIO: 2.8
CO2 SERPL-SCNC: 31 MMOL/L (ref 21–32)
CREAT SERPL-MCNC: 0.95 MG/DL (ref 0.5–1.05)
EGFRCR SERPLBLD CKD-EPI 2021: 61 ML/MIN/1.73M*2
ERYTHROCYTE [DISTWIDTH] IN BLOOD BY AUTOMATED COUNT: 13.4 % (ref 11.5–14.5)
EST. AVERAGE GLUCOSE BLD GHB EST-MCNC: 114 MG/DL
GLUCOSE SERPL-MCNC: 99 MG/DL (ref 74–99)
HBA1C MFR BLD: 5.6 %
HCT VFR BLD AUTO: 41.5 % (ref 36–46)
HDLC SERPL-MCNC: 69.2 MG/DL
HGB BLD-MCNC: 13.2 G/DL (ref 12–16)
LDLC SERPL CALC-MCNC: 104 MG/DL
MCH RBC QN AUTO: 28.6 PG (ref 26–34)
MCHC RBC AUTO-ENTMCNC: 31.8 G/DL (ref 32–36)
MCV RBC AUTO: 90 FL (ref 80–100)
NON HDL CHOLESTEROL: 126 MG/DL (ref 0–149)
NRBC BLD-RTO: 0 /100 WBCS (ref 0–0)
PLATELET # BLD AUTO: 189 X10*3/UL (ref 150–450)
POTASSIUM SERPL-SCNC: 4.5 MMOL/L (ref 3.5–5.3)
PROT SERPL-MCNC: 6.6 G/DL (ref 6.4–8.2)
RBC # BLD AUTO: 4.61 X10*6/UL (ref 4–5.2)
SODIUM SERPL-SCNC: 139 MMOL/L (ref 136–145)
T4 FREE SERPL-MCNC: 1.47 NG/DL (ref 0.78–1.48)
TRIGL SERPL-MCNC: 108 MG/DL (ref 0–149)
TSH SERPL-ACNC: 0.42 MIU/L (ref 0.44–3.98)
VLDL: 22 MG/DL (ref 0–40)
WBC # BLD AUTO: 6.4 X10*3/UL (ref 4.4–11.3)

## 2024-09-13 PROCEDURE — 85027 COMPLETE CBC AUTOMATED: CPT

## 2024-09-13 PROCEDURE — 84439 ASSAY OF FREE THYROXINE: CPT

## 2024-09-13 PROCEDURE — 83036 HEMOGLOBIN GLYCOSYLATED A1C: CPT

## 2024-09-13 PROCEDURE — 82306 VITAMIN D 25 HYDROXY: CPT

## 2024-09-13 PROCEDURE — 36415 COLL VENOUS BLD VENIPUNCTURE: CPT

## 2024-09-13 PROCEDURE — 80053 COMPREHEN METABOLIC PANEL: CPT

## 2024-09-13 PROCEDURE — 80061 LIPID PANEL: CPT

## 2024-09-13 PROCEDURE — 84443 ASSAY THYROID STIM HORMONE: CPT

## 2024-09-17 DIAGNOSIS — J43.8 OTHER EMPHYSEMA (MULTI): ICD-10-CM

## 2024-09-17 DIAGNOSIS — K21.9 GASTRO-ESOPHAGEAL REFLUX DISEASE WITHOUT ESOPHAGITIS: ICD-10-CM

## 2024-09-17 DIAGNOSIS — J44.1 COPD EXACERBATION (MULTI): ICD-10-CM

## 2024-09-17 DIAGNOSIS — I10 ESSENTIAL (PRIMARY) HYPERTENSION: ICD-10-CM

## 2024-09-17 RX ORDER — METOPROLOL SUCCINATE 50 MG/1
50 TABLET, EXTENDED RELEASE ORAL DAILY
Qty: 90 TABLET | Refills: 1 | Status: SHIPPED | OUTPATIENT
Start: 2024-09-17

## 2024-09-17 RX ORDER — DILTIAZEM HYDROCHLORIDE 60 MG/1
TABLET, FILM COATED ORAL
Qty: 6.5 G | Refills: 5 | Status: SHIPPED | OUTPATIENT
Start: 2024-09-17

## 2024-09-17 RX ORDER — OMEPRAZOLE 20 MG/1
CAPSULE, DELAYED RELEASE ORAL
Qty: 180 CAPSULE | Refills: 1 | Status: SHIPPED | OUTPATIENT
Start: 2024-09-17

## 2024-09-23 ENCOUNTER — TELEPHONE (OUTPATIENT)
Dept: PRIMARY CARE | Facility: CLINIC | Age: 79
End: 2024-09-23

## 2024-09-23 ENCOUNTER — TELEPHONE (OUTPATIENT)
Dept: PRIMARY CARE | Facility: CLINIC | Age: 79
End: 2024-09-23
Payer: MEDICARE

## 2024-11-05 DIAGNOSIS — E78.5 DYSLIPIDEMIA: ICD-10-CM

## 2024-11-05 RX ORDER — ROSUVASTATIN CALCIUM 10 MG/1
TABLET, COATED ORAL
Qty: 70 TABLET | Refills: 0 | Status: SHIPPED | OUTPATIENT
Start: 2024-11-05

## 2025-01-02 DIAGNOSIS — E03.9 HYPOTHYROIDISM, UNSPECIFIED: ICD-10-CM

## 2025-01-02 RX ORDER — LEVOTHYROXINE SODIUM 75 UG/1
TABLET ORAL
Qty: 90 TABLET | Refills: 1 | Status: SHIPPED | OUTPATIENT
Start: 2025-01-02

## 2025-02-12 DIAGNOSIS — I71.43 INFRARENAL ABDOMINAL AORTIC ANEURYSM (AAA) WITHOUT RUPTURE (CMS-HCC): ICD-10-CM

## 2025-02-12 DIAGNOSIS — I73.9 PAD (PERIPHERAL ARTERY DISEASE) (CMS-HCC): Primary | ICD-10-CM

## 2025-02-20 ENCOUNTER — HOSPITAL ENCOUNTER (OUTPATIENT)
Dept: VASCULAR MEDICINE | Facility: CLINIC | Age: 80
Discharge: HOME | End: 2025-02-20
Payer: MEDICARE

## 2025-02-20 DIAGNOSIS — I71.43 INFRARENAL ABDOMINAL AORTIC ANEURYSM (AAA) WITHOUT RUPTURE (CMS-HCC): ICD-10-CM

## 2025-02-20 DIAGNOSIS — I73.9 PAD (PERIPHERAL ARTERY DISEASE) (CMS-HCC): ICD-10-CM

## 2025-02-20 PROCEDURE — 93978 VASCULAR STUDY: CPT

## 2025-02-20 PROCEDURE — 93978 VASCULAR STUDY: CPT | Performed by: INTERNAL MEDICINE

## 2025-02-20 PROCEDURE — 93922 UPR/L XTREMITY ART 2 LEVELS: CPT

## 2025-02-20 PROCEDURE — 93922 UPR/L XTREMITY ART 2 LEVELS: CPT | Performed by: INTERNAL MEDICINE

## 2025-02-21 ENCOUNTER — OFFICE VISIT (OUTPATIENT)
Dept: VASCULAR SURGERY | Facility: HOSPITAL | Age: 80
End: 2025-02-21
Payer: MEDICARE

## 2025-02-21 VITALS
HEART RATE: 48 BPM | WEIGHT: 134 LBS | OXYGEN SATURATION: 97 % | DIASTOLIC BLOOD PRESSURE: 62 MMHG | BODY MASS INDEX: 22.88 KG/M2 | HEIGHT: 64 IN | SYSTOLIC BLOOD PRESSURE: 120 MMHG

## 2025-02-21 DIAGNOSIS — I71.43 INFRARENAL ABDOMINAL AORTIC ANEURYSM, WITHOUT RUPTURE (CMS-HCC): ICD-10-CM

## 2025-02-21 DIAGNOSIS — I71.40 ABDOMINAL AORTIC ANEURYSM (AAA) WITHOUT RUPTURE, UNSPECIFIED PART (CMS-HCC): ICD-10-CM

## 2025-02-21 DIAGNOSIS — I73.9 PAD (PERIPHERAL ARTERY DISEASE) (CMS-HCC): Primary | ICD-10-CM

## 2025-02-21 PROCEDURE — 3074F SYST BP LT 130 MM HG: CPT | Performed by: NURSE PRACTITIONER

## 2025-02-21 PROCEDURE — 99406 BEHAV CHNG SMOKING 3-10 MIN: CPT | Performed by: NURSE PRACTITIONER

## 2025-02-21 PROCEDURE — 3078F DIAST BP <80 MM HG: CPT | Performed by: NURSE PRACTITIONER

## 2025-02-21 PROCEDURE — 4004F PT TOBACCO SCREEN RCVD TLK: CPT | Performed by: NURSE PRACTITIONER

## 2025-02-21 PROCEDURE — 1159F MED LIST DOCD IN RCRD: CPT | Performed by: NURSE PRACTITIONER

## 2025-02-21 PROCEDURE — 99213 OFFICE O/P EST LOW 20 MIN: CPT | Performed by: NURSE PRACTITIONER

## 2025-02-21 NOTE — PROGRESS NOTES
"  Vascular Surgery Clinic Note    CC: FUV PAD/AAA    History Of Present Illness:   Caty Marquez is a 79 y.o. female here for routine follow-up of her peripheral artery disease and AAA. She states her walking ability has improved since last year and is able to walk further. This is not lifestyle limiting.  She denies nocturnal rest pain or tissue loss. She still works at her [employer] and at [employer].     She denies abdominal or back pain. She denies amaurosis fugax or TIA symptoms.     She continues to smoke 1 pack per month.     Medical History:  Patient Active Problem List   Diagnosis    Depression with anxiety    Chronic emphysema syndrome (Multi)    Primary hypertension    Dyslipidemia    Gastroesophageal reflux disease with esophagitis without hemorrhage    Cervical radiculopathy due to degenerative joint disease of spine    Wellness examination    Peripheral vascular disease, secondary (CMS-[facility])        SH:    Social Drivers of Health     Tobacco Use: High Risk (2/21/2025)    Patient History     Smoking Tobacco Use: Every Day     Smokeless Tobacco Use: Never     Passive Exposure: Not on file   Alcohol Use: Not on file   Financial Resource Strain: Not on file   Food Insecurity: Not on file   Transportation Needs: Not on file   Physical Activity: Not on file   Stress: Not on file   Social Connections: Not on file   Intimate Partner Violence: Not on file   Depression: Not at risk (9/11/2024)    PHQ-2     PHQ-2 Score: 0   Housing Stability: Not on file   Utilities: Not on file   Digital Equity: Not on file   Health Literacy: Not on file        FH:  No family history on file.     Allergies:   Allergies   Allergen Reactions    Bee Sting Kit Anaphylaxis    Quinolones Anaphylaxis       ROS:  All systems were reviewed and noted to be negative, other than described above.     Objective:  Last Recorded Vitals  Blood pressure 120/62, pulse (!) 48, height 1.626 m (5' 4\"), weight 60.8 kg (134 lb), SpO2 97%.    Meds: "   Current Outpatient Medications   Medication Instructions    acetaminophen (TYLENOL) 500 mg, Every 6 hours PRN    albuterol 90 mcg/actuation aerosol powdr breath activated inhaler 2 puffs, inhalation, Every 4 hours PRN    cholecalciferol, vitamin D3, (VITAMIN D3 ORAL) 500 Units, Daily    cyanocobalamin (Vitamin B-12) 500 mcg tablet 1 tablet, Daily RT    EPINEPHrine (EPIPEN) 0.3 mg    metoprolol succinate XL (TOPROL-XL) 50 mg, oral, Daily    omeprazole (PriLOSEC) 20 mg DR capsule Take 1 capsule daily every morning  30 min before breakfast and at bedtime    rosuvastatin (Crestor) 10 mg tablet TAKE 1 TABLET BY MOUTH, 4 X WEEK,    Symbicort 80-4.5 mcg/actuation inhaler Use 2 puffs once a day, Rinse mouth after use.    Synthroid 75 mcg tablet Take on an empty stomach at the same time each day, either 30 to 60 minutes prior to breakfast       Exam:  Constitutional: Well appearing, NAD   PSYCH: Appropriate mood and affect  Eyes: Sclera clear  Neck: Supple   CV: No tachycardia   RESP: Unlabored breathing   GI: Soft, nontender, non-distended.   SKIN: No lesions  NEURO: No focal deficits noted. Motor/sensory intact.   EXTREMITIES: Warm & well perfused. No evidence of arterial ischemia. + varicosities bilaterally.   PULSES: palpable DP pulses     Imaging Reviewed:  Vascular US aorta iliac duplex complete 02/20/2025    [place] [address]  Tel [phone] and Fax [phone]      Vascular Lab Report  VASC US AORTA ILIAC DUPLEX COMPLETE      Patient Name:      MACKENZIE ONEILL      Reading Physician:  [id number] Cristina Rascon MD, RPVI  Study Date:        2/20/2025             Ordering Physician: [id number] DAO GAITAN  MRN/PID:           [id number]              Technologist:       Domingo Park RVT  Accession#:        [id number]          Technologist 2:  Date of Birth/Age: 1945 / 79 years Encounter#:         [id number]  Gender:            F  Admission Status:  Outpatient             Location Performed: [facility]      Diagnosis/ICD: Infrarenal abdominal aortic aneurysm, without rupture-I71.43  CPT Codes:     39282 Duplex Aorta/IVC/Iliac/Bypass Graft      Patient History H/o AAA.      CONCLUSIONS:    Aorta/Common Iliac Arteries/IVC: A fusiform aneurysm is noted at the level of the infrarenal aorta. Maximal aortic diameter 3.4 cm. Bilateral common iliac arteries demonstrate no evidence of aneurysm.    Comparison:  Compared with study from 1/5/2024, slightly bigger measurements of the diameter of distal abdominal aorta noted on today's exam (previously 3.2 cms maximal diameter, now 3.4 cms).    Imaging & Doppler Findings:    AORTA     AP    Lateral    PSV  Proximal 2.38 cm         60.0 cm/s  Mid    1.96 cm 1.92 cm 81.3 cm/s  Distal  3.35 cm 3.58 cm 44.2 cm/s    RIGHT       AP        PSV  ADRIANA Proximal 1.06 cm 145.00 cm/s    LEFT       AP        PSV  ADRIANA Proximal 1.03 cm 120.00 cm/s      [id number] Cristina Rascon MD, JAI  Electronically signed by [id number] JAI Kuhn MD on 2/20/2025 at 10:50:37 AM        ** Final **  Vascular US ankle brachial index (DAVID) without exercise 02/20/2025    [place] [address]  Tel [phone] and Fax [phone]      Vascular Lab Report  [facility] US ANKLE BRACHIAL INDEX (DAVID) WITHOUT EXERCISE      Patient Name:      MACKENZIE Bruce Physician:  [id number] Cristina Rascon MD, JAI  Study Date:        2/20/2025             Ordering Physician: [id number] DAO GAITAN  MRN/PID:           [id number]              Technologist:       Domingo Park T  Accession#:        [id number]          Technologist 2:  Date of Birth/Age: 1945 / 79 years Encounter#:         [id number]  Gender:            F  Admission Status:  Outpatient            Location Performed: [facility]      Diagnosis/ICD: Peripheral vascular disease, unspecified-I73.9  CPT Codes:     49394 Peripheral artery DAVID  Only      CONCLUSIONS:    Right Lower PVR: Evidence of mild arterial occlusive disease in the right lower extremity at rest. Decreased digital perfusion noted. Multiphasic flow is noted in the right common femoral artery, right posterior tibial artery and right dorsalis pedis artery.    Left Lower PVR: Evidence of mild arterial occlusive disease in the left lower extremity at rest. Decreased digital perfusion noted. Multiphasic flow is noted in the left common femoral artery, left dorsalis pedis artery and left posterior tibial artery.    Comparison:  Compared with study from 1/5/2024, no significant change.    Imaging & Doppler Findings:    RIGHT Lower PVR                Pressures Ratios  Right Posterior Tibial (Ankle) 129 mmHg  0.89  Right Dorsalis Pedis (Ankle)   125 mmHg  0.86  Right Digit (Great Toe)        64 mmHg   0.44        LEFT Lower PVR                Pressures Ratios  Left Posterior Tibial (Ankle) 109 mmHg  0.75  Left Dorsalis Pedis (Ankle)   118 mmHg  0.81  Left Digit (Great Toe)        59 mmHg   0.41      Left  Brachial Pressure 145 mmHg        [id number] Cristina Rascon MD, RPVI  Electronically signed by [id number] Cristina Rascon MD, RPVI on 2/20/2025 at 3:19:55 PM        ** Final **      Assessment & Plan:  1. PAD (peripheral artery disease) (CMS-HCC)  Vascular US ankle brachial index (DAVID) without exercise    Vascular US aorta iliac duplex complete      2. Abdominal aortic aneurysm (AAA) without rupture, unspecified part (CMS-HCC)  Vascular US ankle brachial index (DAVID) without exercise    Vascular US aorta iliac duplex complete      3. Infrarenal abdominal aortic aneurysm, without rupture (CMS-HCC)  Vascular US aorta iliac duplex complete        #PAD  Symptoms have improved with increased walking program.   DAVID has improved to 0.89 on the right and 0.81 on the left.   RTC in 1 year with DAVID.     #AAA  Currently measures 3.4 x 3.6 cm (previously 3.2 x 3.2 cm last year).   RTC in 1 year with abdominal duplex.      Ready to quit: Not Answered  Counseling given: Yes    FARA Botello-CNP

## 2025-03-14 ENCOUNTER — APPOINTMENT (OUTPATIENT)
Dept: PRIMARY CARE | Facility: CLINIC | Age: 80
End: 2025-03-14
Payer: MEDICARE

## 2025-03-14 VITALS
RESPIRATION RATE: 16 BRPM | HEART RATE: 67 BPM | HEIGHT: 64 IN | BODY MASS INDEX: 23 KG/M2 | WEIGHT: 134.7 LBS | SYSTOLIC BLOOD PRESSURE: 112 MMHG | OXYGEN SATURATION: 98 % | DIASTOLIC BLOOD PRESSURE: 58 MMHG

## 2025-03-14 DIAGNOSIS — M81.0 AGE RELATED OSTEOPOROSIS, UNSPECIFIED PATHOLOGICAL FRACTURE PRESENCE: ICD-10-CM

## 2025-03-14 DIAGNOSIS — Z12.31 SCREENING MAMMOGRAM FOR BREAST CANCER: ICD-10-CM

## 2025-03-14 DIAGNOSIS — J43.9 CHRONIC EMPHYSEMA SYNDROME (MULTI): ICD-10-CM

## 2025-03-14 DIAGNOSIS — C50.212 CARCINOMA OF UPPER-INNER QUADRANT OF LEFT FEMALE BREAST, UNSPECIFIED ESTROGEN RECEPTOR STATUS: ICD-10-CM

## 2025-03-14 DIAGNOSIS — E89.0 POSTABLATIVE HYPOTHYROIDISM: ICD-10-CM

## 2025-03-14 DIAGNOSIS — Z00.00 ROUTINE GENERAL MEDICAL EXAMINATION AT HEALTH CARE FACILITY: Primary | ICD-10-CM

## 2025-03-14 DIAGNOSIS — K21.00 GASTROESOPHAGEAL REFLUX DISEASE WITH ESOPHAGITIS WITHOUT HEMORRHAGE: ICD-10-CM

## 2025-03-14 DIAGNOSIS — N39.0 URINARY TRACT INFECTION WITHOUT HEMATURIA, SITE UNSPECIFIED: ICD-10-CM

## 2025-03-14 DIAGNOSIS — I10 PRIMARY HYPERTENSION: ICD-10-CM

## 2025-03-14 DIAGNOSIS — E78.5 DYSLIPIDEMIA: ICD-10-CM

## 2025-03-14 DIAGNOSIS — G95.19 OTHER VASCULAR MYELOPATHIES: ICD-10-CM

## 2025-03-14 DIAGNOSIS — Z00.00 WELLNESS EXAMINATION: ICD-10-CM

## 2025-03-14 PROCEDURE — 1170F FXNL STATUS ASSESSED: CPT | Performed by: INTERNAL MEDICINE

## 2025-03-14 PROCEDURE — 1159F MED LIST DOCD IN RCRD: CPT | Performed by: INTERNAL MEDICINE

## 2025-03-14 PROCEDURE — 3078F DIAST BP <80 MM HG: CPT | Performed by: INTERNAL MEDICINE

## 2025-03-14 PROCEDURE — 99214 OFFICE O/P EST MOD 30 MIN: CPT | Performed by: INTERNAL MEDICINE

## 2025-03-14 PROCEDURE — 4004F PT TOBACCO SCREEN RCVD TLK: CPT | Performed by: INTERNAL MEDICINE

## 2025-03-14 PROCEDURE — G0439 PPPS, SUBSEQ VISIT: HCPCS | Performed by: INTERNAL MEDICINE

## 2025-03-14 PROCEDURE — 3074F SYST BP LT 130 MM HG: CPT | Performed by: INTERNAL MEDICINE

## 2025-03-14 PROCEDURE — 1124F ACP DISCUSS-NO DSCNMKR DOCD: CPT | Performed by: INTERNAL MEDICINE

## 2025-03-14 RX ORDER — LOSARTAN POTASSIUM 50 MG/1
TABLET ORAL
Qty: 30 TABLET | Refills: 2 | Status: SHIPPED | OUTPATIENT
Start: 2025-03-14

## 2025-03-14 RX ORDER — METOPROLOL SUCCINATE 25 MG/1
TABLET, EXTENDED RELEASE ORAL
Qty: 30 TABLET | Refills: 2 | Status: SHIPPED | OUTPATIENT
Start: 2025-03-14

## 2025-03-14 ASSESSMENT — ENCOUNTER SYMPTOMS
BLOOD IN STOOL: 0
BACK PAIN: 1
APPETITE CHANGE: 1
CONSTIPATION: 0
TROUBLE SWALLOWING: 0
OCCASIONAL FEELINGS OF UNSTEADINESS: 0
LOSS OF SENSATION IN FEET: 0
DEPRESSION: 0
VOICE CHANGE: 0
ARTHRALGIAS: 1

## 2025-03-14 ASSESSMENT — COLUMBIA-SUICIDE SEVERITY RATING SCALE - C-SSRS
2. HAVE YOU ACTUALLY HAD ANY THOUGHTS OF KILLING YOURSELF?: NO
1. IN THE PAST MONTH, HAVE YOU WISHED YOU WERE DEAD OR WISHED YOU COULD GO TO SLEEP AND NOT WAKE UP?: NO
6. HAVE YOU EVER DONE ANYTHING, STARTED TO DO ANYTHING, OR PREPARED TO DO ANYTHING TO END YOUR LIFE?: NO

## 2025-03-14 ASSESSMENT — ACTIVITIES OF DAILY LIVING (ADL)
DRESSING: INDEPENDENT
BATHING: INDEPENDENT
MANAGING_FINANCES: INDEPENDENT
TAKING_MEDICATION: INDEPENDENT
DOING_HOUSEWORK: INDEPENDENT
GROCERY_SHOPPING: INDEPENDENT

## 2025-03-14 NOTE — ASSESSMENT & PLAN NOTE
Continue current dose, since important leg cramps, discussed otc suplements for them re kitty in next visit her cramps

## 2025-03-14 NOTE — PATIENT INSTRUCTIONS
Take omeprazole always in empty stomach , reduce metoprolol to 25 in the evening and start losartan 25 in the morning, add CoQ10 daily otc, as well as magnesium glycinate 100 mg.  Complete your mammogram soon, bone density no urgent. Please go to local pharmacy for RSV vaccine, for tetanus/whooping vaccine and for shingrex.

## 2025-03-14 NOTE — PROGRESS NOTES
"Subjective   Patient ID: Caty Marquez is a 79 y.o. female who presents for UTI and Medicare Annual Wellness Visit Subsequent.    HPI   She lost her  in November , she just started going to [employer], she is moving out of her home , for now renting apartment on her own, but she is able to handle her mood , sleep about the same, her apetite varies.  Heartburn about 2 x week triggered by coffee and spices, uses ocassional tums, improved from 1 year ago,   Uses symbicort bid, exertional dyspnea after 2 flights of stairs, improved since last year due to her walking.   Feels dysuria, may have a UTI    Review of Systems   Constitutional:  Positive for appetite change.   HENT:  Negative for trouble swallowing and voice change.    Gastrointestinal:  Negative for blood in stool and constipation.   Musculoskeletal:  Positive for arthralgias and back pain.   All other systems reviewed and are negative.      Objective   /58   Pulse 67   Resp 16   Ht 1.626 m (5' 4\")   Wt 61.1 kg (134 lb 11.2 oz)   SpO2 98%   BMI 23.12 kg/m²     Physical Exam  Eyes - conjunctiva clear, PERRLA  HEENT - no impacted wax  Neck - No cervical lymphadenopathy, no thyromegaly  Axilla - no palpable lymphadenopathy  Breast - left breast pendular   nontender, no palpable nodules, retractions or discharge bilaterally  Cardiac - regular rate and rhythm, no murmurs, no carotid bruit, no JVP  Lung- clear to auscultation, no rales, no rhonchi, no wheezing  GI- normally active bowel sounds, nontender, nondistended, no hepatosplenomegaly, no rebound  MSK - no deformities  Neuro - non focal, oriented x 3  Extremities - no edema, weak distal arterial pulses  Skin - few moles  Psychiatric - pleasant, cooperative, no hallucinations    Assessment/Plan   Problem List Items Addressed This Visit             ICD-10-CM    Chronic emphysema syndrome (Multi) J43.9     Continue current meds, discuss smoke cessation         Primary hypertension I10     " Reduction of metoprolol ( due to enfisema) start losartan , re eval in 2m         Relevant Medications    metoprolol succinate XL (Toprol-XL) 25 mg 24 hr tablet    losartan (Cozaar) 50 mg tablet    Other Relevant Orders    Basic metabolic panel    Dyslipidemia E78.5     Continue current dose, since important leg cramps, discussed otc suplements for them re eval in next visit her cramps         Gastroesophageal reflux disease with esophagitis without hemorrhage K21.00     Continue current meds         Wellness examination Z00.00     Discussed diet, exercise, immunizations, and screening appropriate for their age.  Colonoscopy: no longer,last 2015  Dexa:July 2022 due   Mammogram: 2016 , right mastectomy, over due             Other vascular myelopathies G95.19     Follow by vascular         Carcinoma of upper-inner quadrant of left female breast, unspecified estrogen receptor status C50.212    Postablative hypothyroidism E89.0    Relevant Orders    TSH with reflex to Free T4 if abnormal     Other Visit Diagnoses         Codes    Routine general medical examination at health care facility    -  Primary Z00.00    Relevant Orders    1 Year Follow Up In Primary Care - Wellness Exam    Screening mammogram for breast cancer     Z12.31    Relevant Orders    BI mammo left screening tomosynthesis

## 2025-03-14 NOTE — ASSESSMENT & PLAN NOTE
Discussed diet, exercise, immunizations, and screening appropriate for their age.  Colonoscopy: no longer,last 2015  Dexa:July 2022 due   Mammogram: 2016 , right mastectomy, over due

## 2025-03-15 LAB
ANION GAP SERPL CALCULATED.4IONS-SCNC: 10 MMOL/L (CALC) (ref 7–17)
BUN SERPL-MCNC: 18 MG/DL (ref 7–25)
BUN/CREAT SERPL: NORMAL (CALC) (ref 6–22)
CALCIUM SERPL-MCNC: 9.2 MG/DL (ref 8.6–10.4)
CHLORIDE SERPL-SCNC: 107 MMOL/L (ref 98–110)
CO2 SERPL-SCNC: 25 MMOL/L (ref 20–32)
CREAT SERPL-MCNC: 0.94 MG/DL (ref 0.6–0.95)
EGFRCR SERPLBLD CKD-EPI 2021: 61 ML/MIN/1.73M2
GLUCOSE SERPL-MCNC: 97 MG/DL (ref 65–99)
POTASSIUM SERPL-SCNC: 4.2 MMOL/L (ref 3.5–5.3)
SODIUM SERPL-SCNC: 142 MMOL/L (ref 135–146)
T4 FREE SERPL-MCNC: 1.6 NG/DL (ref 0.8–1.8)
TSH SERPL-ACNC: 0.13 MIU/L (ref 0.4–4.5)

## 2025-03-16 LAB — BACTERIA UR CULT: ABNORMAL

## 2025-03-17 ENCOUNTER — TELEPHONE (OUTPATIENT)
Dept: PRIMARY CARE | Facility: CLINIC | Age: 80
End: 2025-03-17
Payer: MEDICARE

## 2025-03-17 DIAGNOSIS — N30.01 ACUTE CYSTITIS WITH HEMATURIA: Primary | ICD-10-CM

## 2025-03-17 RX ORDER — SULFAMETHOXAZOLE AND TRIMETHOPRIM 800; 160 MG/1; MG/1
1 TABLET ORAL 2 TIMES DAILY
Qty: 14 TABLET | Refills: 0 | Status: SHIPPED | OUTPATIENT
Start: 2025-03-17 | End: 2025-03-24

## 2025-03-21 ENCOUNTER — APPOINTMENT (OUTPATIENT)
Dept: RADIOLOGY | Facility: HOSPITAL | Age: 80
End: 2025-03-21
Payer: MEDICARE

## 2025-03-24 ENCOUNTER — TELEPHONE (OUTPATIENT)
Dept: PRIMARY CARE | Facility: CLINIC | Age: 80
End: 2025-03-24
Payer: MEDICARE

## 2025-04-04 ENCOUNTER — HOSPITAL ENCOUNTER (OUTPATIENT)
Dept: RADIOLOGY | Facility: CLINIC | Age: 80
End: 2025-04-04
Payer: MEDICARE

## 2025-04-13 DIAGNOSIS — E78.5 DYSLIPIDEMIA: ICD-10-CM

## 2025-04-14 RX ORDER — ROSUVASTATIN CALCIUM 10 MG/1
TABLET, COATED ORAL
Qty: 70 TABLET | Refills: 1 | Status: SHIPPED | OUTPATIENT
Start: 2025-04-14

## 2025-05-07 ENCOUNTER — APPOINTMENT (OUTPATIENT)
Dept: PRIMARY CARE | Facility: CLINIC | Age: 80
End: 2025-05-07
Payer: MEDICARE

## 2025-05-14 ENCOUNTER — HOSPITAL ENCOUNTER (OUTPATIENT)
Dept: RADIOLOGY | Facility: HOSPITAL | Age: 80
Discharge: HOME | End: 2025-05-14
Payer: MEDICARE

## 2025-05-14 ENCOUNTER — HOSPITAL ENCOUNTER (OUTPATIENT)
Dept: RADIOLOGY | Facility: CLINIC | Age: 80
Discharge: HOME | End: 2025-05-14
Payer: MEDICARE

## 2025-05-14 VITALS — WEIGHT: 134.7 LBS | HEIGHT: 64 IN | BODY MASS INDEX: 23 KG/M2

## 2025-05-14 DIAGNOSIS — M81.0 AGE RELATED OSTEOPOROSIS, UNSPECIFIED PATHOLOGICAL FRACTURE PRESENCE: ICD-10-CM

## 2025-05-14 DIAGNOSIS — Z12.31 SCREENING MAMMOGRAM FOR BREAST CANCER: ICD-10-CM

## 2025-05-14 PROCEDURE — 77080 DXA BONE DENSITY AXIAL: CPT | Performed by: RADIOLOGY

## 2025-05-14 PROCEDURE — 77061 BREAST TOMOSYNTHESIS UNI: CPT | Mod: LT

## 2025-05-14 PROCEDURE — 77080 DXA BONE DENSITY AXIAL: CPT

## 2025-05-14 PROCEDURE — 77067 SCR MAMMO BI INCL CAD: CPT | Mod: LEFT SIDE | Performed by: RADIOLOGY

## 2025-05-14 PROCEDURE — 77063 BREAST TOMOSYNTHESIS BI: CPT | Mod: LEFT SIDE | Performed by: RADIOLOGY

## 2025-06-11 ENCOUNTER — APPOINTMENT (OUTPATIENT)
Dept: PRIMARY CARE | Facility: CLINIC | Age: 80
End: 2025-06-11
Payer: MEDICARE

## 2025-06-11 VITALS
OXYGEN SATURATION: 96 % | BODY MASS INDEX: 23.75 KG/M2 | SYSTOLIC BLOOD PRESSURE: 119 MMHG | HEART RATE: 77 BPM | RESPIRATION RATE: 17 BRPM | DIASTOLIC BLOOD PRESSURE: 67 MMHG | WEIGHT: 138.45 LBS

## 2025-06-11 DIAGNOSIS — M19.90 CHRONIC OSTEOARTHRITIS: Primary | ICD-10-CM

## 2025-06-11 DIAGNOSIS — E89.0 POSTABLATIVE HYPOTHYROIDISM: ICD-10-CM

## 2025-06-11 DIAGNOSIS — R60.0 LOCALIZED EDEMA: ICD-10-CM

## 2025-06-11 DIAGNOSIS — Z91.030 ALLERGIC TO BEES: ICD-10-CM

## 2025-06-11 DIAGNOSIS — I10 PRIMARY HYPERTENSION: ICD-10-CM

## 2025-06-11 PROCEDURE — 3078F DIAST BP <80 MM HG: CPT | Performed by: INTERNAL MEDICINE

## 2025-06-11 PROCEDURE — 1159F MED LIST DOCD IN RCRD: CPT | Performed by: INTERNAL MEDICINE

## 2025-06-11 PROCEDURE — 3074F SYST BP LT 130 MM HG: CPT | Performed by: INTERNAL MEDICINE

## 2025-06-11 PROCEDURE — 1123F ACP DISCUSS/DSCN MKR DOCD: CPT | Performed by: INTERNAL MEDICINE

## 2025-06-11 PROCEDURE — G2211 COMPLEX E/M VISIT ADD ON: HCPCS | Performed by: INTERNAL MEDICINE

## 2025-06-11 PROCEDURE — 4004F PT TOBACCO SCREEN RCVD TLK: CPT | Performed by: INTERNAL MEDICINE

## 2025-06-11 PROCEDURE — 99214 OFFICE O/P EST MOD 30 MIN: CPT | Performed by: INTERNAL MEDICINE

## 2025-06-11 RX ORDER — FUROSEMIDE 20 MG/1
TABLET ORAL
Qty: 30 TABLET | Refills: 0 | Status: SHIPPED | OUTPATIENT
Start: 2025-06-11

## 2025-06-11 RX ORDER — EPINEPHRINE 0.3 MG/.3ML
0.3 INJECTION SUBCUTANEOUS ONCE AS NEEDED
Qty: 1 EACH | Refills: 0 | Status: SHIPPED | OUTPATIENT
Start: 2025-06-11

## 2025-06-11 RX ORDER — METOPROLOL SUCCINATE 25 MG/1
25 TABLET, EXTENDED RELEASE ORAL NIGHTLY
Qty: 90 TABLET | Refills: 1 | Status: CANCELLED | OUTPATIENT
Start: 2025-06-11 | End: 2025-12-08

## 2025-06-11 RX ORDER — LOSARTAN POTASSIUM 100 MG/1
TABLET ORAL
Qty: 90 TABLET | Refills: 1 | Status: SHIPPED | OUTPATIENT
Start: 2025-06-11 | End: 2025-06-11 | Stop reason: ALTCHOICE

## 2025-06-11 RX ORDER — LOSARTAN POTASSIUM 100 MG/1
100 TABLET ORAL DAILY
Qty: 100 TABLET | Refills: 1 | Status: SHIPPED | OUTPATIENT
Start: 2025-06-11 | End: 2026-07-16

## 2025-06-11 ASSESSMENT — ENCOUNTER SYMPTOMS
DEPRESSION: 0
LOSS OF SENSATION IN FEET: 0
OCCASIONAL FEELINGS OF UNSTEADINESS: 1

## 2025-06-11 NOTE — ASSESSMENT & PLAN NOTE
Stop metop succ , increased losartan to 100 q day re eval in 3 m  Orders:    losartan (Cozaar) 100 mg tablet; Take 1 tablet (100 mg) by mouth once daily.

## 2025-06-11 NOTE — PROGRESS NOTES
Subjective   Patient ID: Caty Marquez is a 79 y.o. female who presents for Follow-up.new bp med    HPI She moved to apartment in mid March, she is more used to be in apartment. She reduced metoprolol succ to 25 and start losartan to 50 .  She added magnesium and CoQ10 and leg cramps have reduced,   She had mild ankle edema bilateral more left side .  Also she had for few days intense pain on right big toe that resolved by itself      Review of Systems   Musculoskeletal:         She has intermitent neck pain with radicular radiation on left arm. She has seen provider outside network       Objective   /67 (BP Location: Left arm, Patient Position: Sitting)   Pulse 77   Resp 17   Wt 62.8 kg (138 lb 7.2 oz)   SpO2 96%   BMI 23.75 kg/m²     Physical Exam  Eyes - conjunctivae clear, PERRLA  HEENT - no impacted wax  Neck - no cervical lymphadenopathy, no thyromegaly  Axilla - no palpable lymphadenopathy  Cardiac- regular rate and rhythm, no murmurs, no carotid bruit, no JVP  Lung - , no rales, no rhonchi, no wheezing, prolong espiratory phase  GI - normally active bowel sounds, non tender, non distended, no hepatosplenomegaly, no rebound  MSK - ROM c spine limited  Extremities - bialteral varicose veins and spider veins, trace ankle edema,  distal pulses present  Neuro - non focal, oriented x 3  Skin - no bruises, no rashes  Psychiatric - pleasant, well groom, no hallucinations    Assessment/Plan   Assessment & Plan  Primary hypertension  Stop metop succ , increased losartan to 100 q day re eval in 3 m  Orders:    losartan (Cozaar) 100 mg tablet; Take 1 tablet (100 mg) by mouth once daily.    Allergic to bees    Orders:    EPINEPHrine 0.3 mg/0.3 mL injection syringe; Inject 0.3 mL (0.3 mg) into the muscle 1 time if needed for anaphylaxis for up to 1 dose. As Directed    Localized edema  Discussed support stockings when cooler and prn furosemide   Orders:    furosemide (Lasix) 20 mg tablet; Take 1/2 to 1 tab q  am PRN edema    Postablative hypothyroidism  Continue current dose,next visit will recheck labs       Chronic osteoarthritis  Handicapped parking, episode on toe ? Pseudogoat ? Labs in next visit for uric acid

## 2025-07-20 DIAGNOSIS — K21.9 GASTRO-ESOPHAGEAL REFLUX DISEASE WITHOUT ESOPHAGITIS: ICD-10-CM

## 2025-07-20 DIAGNOSIS — E03.9 HYPOTHYROIDISM, UNSPECIFIED: ICD-10-CM

## 2025-07-21 DIAGNOSIS — K21.9 GASTRO-ESOPHAGEAL REFLUX DISEASE WITHOUT ESOPHAGITIS: ICD-10-CM

## 2025-07-21 DIAGNOSIS — E03.9 HYPOTHYROIDISM, UNSPECIFIED: ICD-10-CM

## 2025-07-21 RX ORDER — LEVOTHYROXINE SODIUM 75 UG/1
TABLET ORAL
Qty: 90 TABLET | Refills: 1 | Status: SHIPPED | OUTPATIENT
Start: 2025-07-21

## 2025-07-21 RX ORDER — OMEPRAZOLE 20 MG/1
CAPSULE, DELAYED RELEASE ORAL
Qty: 180 CAPSULE | Refills: 1 | Status: SHIPPED | OUTPATIENT
Start: 2025-07-21 | End: 2025-07-21 | Stop reason: SDUPTHER

## 2025-07-21 RX ORDER — OMEPRAZOLE 20 MG/1
CAPSULE, DELAYED RELEASE ORAL
Qty: 180 CAPSULE | Refills: 1 | Status: SHIPPED | OUTPATIENT
Start: 2025-07-21

## 2025-07-21 RX ORDER — LEVOTHYROXINE SODIUM 75 UG/1
TABLET ORAL
Qty: 90 TABLET | Refills: 1 | Status: SHIPPED | OUTPATIENT
Start: 2025-07-21 | End: 2025-07-21 | Stop reason: SDUPTHER

## 2025-09-10 ENCOUNTER — APPOINTMENT (OUTPATIENT)
Dept: PRIMARY CARE | Facility: CLINIC | Age: 80
End: 2025-09-10
Payer: MEDICARE

## 2026-03-18 ENCOUNTER — APPOINTMENT (OUTPATIENT)
Dept: PRIMARY CARE | Facility: CLINIC | Age: 81
End: 2026-03-18
Payer: MEDICARE